# Patient Record
Sex: FEMALE | Race: WHITE | NOT HISPANIC OR LATINO
[De-identification: names, ages, dates, MRNs, and addresses within clinical notes are randomized per-mention and may not be internally consistent; named-entity substitution may affect disease eponyms.]

---

## 2017-10-05 ENCOUNTER — TRANSCRIPTION ENCOUNTER (OUTPATIENT)
Age: 62
End: 2017-10-05

## 2018-03-03 ENCOUNTER — TRANSCRIPTION ENCOUNTER (OUTPATIENT)
Age: 63
End: 2018-03-03

## 2018-08-22 ENCOUNTER — TRANSCRIPTION ENCOUNTER (OUTPATIENT)
Age: 63
End: 2018-08-22

## 2019-03-14 ENCOUNTER — TRANSCRIPTION ENCOUNTER (OUTPATIENT)
Age: 64
End: 2019-03-14

## 2019-06-28 ENCOUNTER — RECORD ABSTRACTING (OUTPATIENT)
Age: 64
End: 2019-06-28

## 2019-06-28 DIAGNOSIS — Z86.39 PERSONAL HISTORY OF OTHER ENDOCRINE, NUTRITIONAL AND METABOLIC DISEASE: ICD-10-CM

## 2019-06-28 DIAGNOSIS — Z86.79 PERSONAL HISTORY OF OTHER DISEASES OF THE CIRCULATORY SYSTEM: ICD-10-CM

## 2019-06-28 DIAGNOSIS — Z87.898 PERSONAL HISTORY OF OTHER SPECIFIED CONDITIONS: ICD-10-CM

## 2019-06-28 DIAGNOSIS — Z86.69 PERSONAL HISTORY OF OTHER DISEASES OF THE NERVOUS SYSTEM AND SENSE ORGANS: ICD-10-CM

## 2020-01-13 ENCOUNTER — APPOINTMENT (OUTPATIENT)
Dept: COLORECTAL SURGERY | Facility: CLINIC | Age: 65
End: 2020-01-13
Payer: COMMERCIAL

## 2020-01-13 VITALS
OXYGEN SATURATION: 97 % | HEIGHT: 60 IN | DIASTOLIC BLOOD PRESSURE: 78 MMHG | RESPIRATION RATE: 14 BRPM | WEIGHT: 125 LBS | BODY MASS INDEX: 24.54 KG/M2 | SYSTOLIC BLOOD PRESSURE: 131 MMHG | HEART RATE: 86 BPM

## 2020-01-13 DIAGNOSIS — K64.5 PERIANAL VENOUS THROMBOSIS: ICD-10-CM

## 2020-01-13 PROCEDURE — 99204 OFFICE O/P NEW MOD 45 MIN: CPT | Mod: 25

## 2020-01-13 PROCEDURE — 46600 DIAGNOSTIC ANOSCOPY SPX: CPT

## 2020-01-13 NOTE — PHYSICAL EXAM
[Normal Breath Sounds] : Normal breath sounds [Normal Rate and Rhythm] : normal rate and rhythm [Normal Heart Sounds] : normal heart sounds [No Rash or Lesion] : No rash or lesion [Alert] : alert [Oriented to Person] : oriented to person [Oriented to Place] : oriented to place [Oriented to Time] : oriented to time [Calm] : calm [Abdomen Masses] : No abdominal masses [Tender] : nontender [Abdomen Tenderness] : ~T No ~M abdominal tenderness [Normal rectal exam] : exam was normal [None] : no anal fissures seen [Excoriation] : no perianal excoriation [Multiple Sinus Tracts] : no perianal sinus tracts [Fistula] : no fistulas [Ulcer ___ cm] : no ulcers [Wart] : no warts [Pilonidal Cyst] : no pilonidal cysts [Pilonidal Sinus] : no pilonidal sinus [Pilonidal Sinus Draining] : no pilonidal sinus drainage [Tender, Swollen] : nontender, non-swollen [Skin Tags] : residual hemorrhoidal skin tags were noted [Thrombosed] : that was thrombosed [Normal] : was normal [JVD] : no jugular venous distention  [Wheezing] : no wheezing was heard [de-identified] : round, NT/ND, +BS [de-identified] : normal female [de-identified] : anoscopy reveals thrombosed external hemorrhoid that has burst [de-identified] : Intact [de-identified] : NELSON/+ROM [de-identified] : NC/AT

## 2020-01-13 NOTE — REVIEW OF SYSTEMS
[Wheezing] : wheezing [Shortness Of Breath] : shortness of breath [Negative] : Heme/Lymph [FreeTextEntry5] : Varicose Veins [FreeTextEntry7] : Daily BM

## 2020-01-13 NOTE — ASSESSMENT
[FreeTextEntry1] : 64-year-old female with right posterior spontaneously ruptured thrombosed external hemorrhoid. She is a family history significant for colon cancer and has never had a colonoscopy. I strongly reinforced the preventative benefits of colonoscopy.

## 2020-01-13 NOTE — HISTORY OF PRESENT ILLNESS
[FreeTextEntry1] : Patient is a 65 yo WF here with complaints of a swelling/growth on her anus.  She states that it did pop and it did bleed for a couple of days and then flattened out and stopped.  It feels much better now.  She does have a daily bowel movement 2-3 times a day and rarely strains and denies any bleeding normally.  She has never had a colonoscopy.

## 2021-09-12 ENCOUNTER — TRANSCRIPTION ENCOUNTER (OUTPATIENT)
Age: 66
End: 2021-09-12

## 2022-02-23 ENCOUNTER — APPOINTMENT (OUTPATIENT)
Dept: ENDOCRINOLOGY | Facility: CLINIC | Age: 67
End: 2022-02-23

## 2022-03-11 ENCOUNTER — APPOINTMENT (OUTPATIENT)
Dept: CARDIOLOGY | Facility: CLINIC | Age: 67
End: 2022-03-11
Payer: MEDICARE

## 2022-03-11 ENCOUNTER — NON-APPOINTMENT (OUTPATIENT)
Age: 67
End: 2022-03-11

## 2022-03-11 VITALS
BODY MASS INDEX: 24.15 KG/M2 | DIASTOLIC BLOOD PRESSURE: 78 MMHG | OXYGEN SATURATION: 98 % | SYSTOLIC BLOOD PRESSURE: 134 MMHG | HEIGHT: 60 IN | HEART RATE: 80 BPM | WEIGHT: 123 LBS | TEMPERATURE: 98 F | RESPIRATION RATE: 15 BRPM

## 2022-03-11 DIAGNOSIS — R40.0 SOMNOLENCE: ICD-10-CM

## 2022-03-11 PROCEDURE — 93306 TTE W/DOPPLER COMPLETE: CPT

## 2022-03-11 PROCEDURE — 93000 ELECTROCARDIOGRAM COMPLETE: CPT

## 2022-03-11 PROCEDURE — 99203 OFFICE O/P NEW LOW 30 MIN: CPT

## 2022-03-23 NOTE — DISCUSSION/SUMMARY
[FreeTextEntry1] : This is a 66-year-old female with past medical history significant for heart murmur,/mitral valve prolapse, since childhood, occasional dyspnea on exertion when walking up stairs, hypercholesterolemia, snoring, daytime fatigue, and witnessed apneic episode dyspepsia, occasional palpitations, who comes in for cardiac consultation.\par She denies chest pain, shortness of breath, dizziness or syncope.  She has no history of rheumatic fever.  She does not drink excessive caffeine or alcohol.\par Electrocardiogram March 11, 2022 demonstrate normal sinus rhythm rate of 80 bpm and is otherwise remarkable for poor R wave progression and left atrial abnormality.\par Blood work done by her endocrinologist February 3, 2022 demonstrated cholesterol 271, triglycerides 159, HDL of 57, LDL calculated 182 mg/dL and a blood sugar of 92 mg/dL.\par The patient is on no medications.  She does not wish to consider medical therapy for her hypercholesterolemia.  She understands the risk of myocardial infarction and stroke but prefers to focus on her diet and increase her daily walking.  I have explained to her that the success rate to achieve an LDL cholesterol of less than 100 mg/dL will be small.  She will have repeat blood work in 3 months.\par She will have an echo Doppler examination later today to evaluate her murmur, left ventricular function, chamber size, rule out mitral valve prolapse, rule out hypertrophy and to evaluate her ejection fraction.\par The patient is instructed follow-up with her primary care physician and endocrinologist.\par I recommended she schedule exercise stress test to evaluate her dyspnea on exertion.  She does not wish to do that at this time.  She prefers to increase her aerobic capacity on her own timetable.\par She should also schedule a sleep study to rule out sleep apnea.  She will take this under consideration as well.\par The patient understands that aerobic exercises must be increased to 40 minutes 4 times per week. A detailed discussion of lifestyle modification was done today. The patient has a good understanding of the diagnosis, and treatment plan. Lifestyle modification was also outlined.\par \par Thank you for allowing participate care of this patient.  Please do not hesitate to call if you have any questions.\par \par

## 2022-03-23 NOTE — PHYSICAL EXAM
[Well Developed] : well developed [Well Nourished] : well nourished [No Acute Distress] : no acute distress [Normal Conjunctiva] : normal conjunctiva [Normal Venous Pressure] : normal venous pressure [No Carotid Bruit] : no carotid bruit [Normal S1, S2] : normal S1, S2 [No Rub] : no rub [No Gallop] : no gallop [5th Left ICS - MCL] : palpated at the 5th LICS in the midclavicular line [Normal] : normal [No Precordial Heave] : no precordial heave was noted [Normal Rate] : normal [Rhythm Regular] : regular [Normal S1] : normal S1 [Normal S2] : normal S2 [II] : a grade 2 [No Pitting Edema] : no pitting edema present [2+] : left 2+ [No Abnormalities] : the abdominal aorta was not enlarged and no bruit was heard [Clear Lung Fields] : clear lung fields [Good Air Entry] : good air entry [No Respiratory Distress] : no respiratory distress  [Soft] : abdomen soft [Non Tender] : non-tender [No Masses/organomegaly] : no masses/organomegaly [Normal Bowel Sounds] : normal bowel sounds [Normal Gait] : normal gait [No Edema] : no edema [No Cyanosis] : no cyanosis [No Clubbing] : no clubbing [No Varicosities] : no varicosities [No Rash] : no rash [No Skin Lesions] : no skin lesions [Moves all extremities] : moves all extremities [No Focal Deficits] : no focal deficits [Normal Speech] : normal speech [Alert and Oriented] : alert and oriented [Normal memory] : normal memory [S3] : no S3 [S4] : no S4 [Right Carotid Bruit] : no bruit heard over the right carotid [Left Carotid Bruit] : no bruit heard over the left carotid [Right Femoral Bruit] : no bruit heard over the right femoral artery [Left Femoral Bruit] : no bruit heard over the left femoral artery

## 2022-03-23 NOTE — REASON FOR VISIT
[Symptom and Test Evaluation] : symptom and test evaluation [CV Risk Factors and Non-Cardiac Disease] : CV risk factors and non-cardiac disease [Structural Heart and Valve Disease] : structural heart and valve disease [Hyperlipidemia] : hyperlipidemia [FreeTextEntry1] : 66 year old female with past medical history of mitral valve prolapse since she was a child, hyperlipidemia, hearing loss, tinnitus, presents for cardiac/lipid evaluation. Patient states she has been feeling some dyspnea on exertion whenever she goes up stairs or uphill. Complains of palpitations for about a month now where it feels as if her heart skips a beat. Patient states she feels tired during the day, snores at times throughout the night, and may have had a apneic episode at night. \par Labs drawn on 2/03/2022 demonstrate cholesterol of 271, triglycerides of 158, HDL 57, LDL calculated 182.

## 2022-06-14 ENCOUNTER — LABORATORY RESULT (OUTPATIENT)
Age: 67
End: 2022-06-14

## 2022-06-14 ENCOUNTER — APPOINTMENT (OUTPATIENT)
Dept: CARDIOLOGY | Facility: CLINIC | Age: 67
End: 2022-06-14
Payer: MEDICARE

## 2022-06-14 VITALS
WEIGHT: 126 LBS | TEMPERATURE: 98.3 F | HEIGHT: 60 IN | DIASTOLIC BLOOD PRESSURE: 78 MMHG | BODY MASS INDEX: 24.74 KG/M2 | HEART RATE: 80 BPM | SYSTOLIC BLOOD PRESSURE: 122 MMHG | RESPIRATION RATE: 16 BRPM | OXYGEN SATURATION: 99 %

## 2022-06-14 DIAGNOSIS — R00.2 PALPITATIONS: ICD-10-CM

## 2022-06-14 DIAGNOSIS — R01.1 CARDIAC MURMUR, UNSPECIFIED: ICD-10-CM

## 2022-06-14 DIAGNOSIS — R06.81 APNEA, NOT ELSEWHERE CLASSIFIED: ICD-10-CM

## 2022-06-14 DIAGNOSIS — R06.00 DYSPNEA, UNSPECIFIED: ICD-10-CM

## 2022-06-14 DIAGNOSIS — I34.1 NONRHEUMATIC MITRAL (VALVE) PROLAPSE: ICD-10-CM

## 2022-06-14 DIAGNOSIS — I65.29 OCCLUSION AND STENOSIS OF UNSPECIFIED CAROTID ARTERY: ICD-10-CM

## 2022-06-14 DIAGNOSIS — E78.00 PURE HYPERCHOLESTEROLEMIA, UNSPECIFIED: ICD-10-CM

## 2022-06-14 DIAGNOSIS — G47.00 INSOMNIA, UNSPECIFIED: ICD-10-CM

## 2022-06-14 DIAGNOSIS — R06.83 SNORING: ICD-10-CM

## 2022-06-14 PROCEDURE — 93880 EXTRACRANIAL BILAT STUDY: CPT

## 2022-06-14 PROCEDURE — 99214 OFFICE O/P EST MOD 30 MIN: CPT

## 2022-06-14 NOTE — DISCUSSION/SUMMARY
[FreeTextEntry1] : This is a 67-year-old female with past medical history significant for heart murmur,/mitral valve prolapse, since childhood, occasional dyspnea on exertion when walking up stairs, hypercholesterolemia, snoring, daytime fatigue, and witnessed apneic episode dyspepsia, occasional palpitations, who comes in for cardiac consultation.\par She denies chest pain, shortness of breath, dizziness or syncope.  She has no history of rheumatic fever.  She does not drink excessive caffeine or alcohol.\par The patient was scheduled for an exercise stress test today, but she felt a little dizzy with the heat outside and wishes to postpone this evaluation to a later date.\par She was also scheduled for sleep study to rule out sleep apnea but will postpone this to a later date.\par Echo Doppler examination done March 11, 2022 demonstrated minimal mitral valve regurgitation, minimal pulmonic valve regurgitation, mild tricuspid valve regurgitation, dyskinesis of the interatrial septum, and normal ejection fraction of 66%.\par \par Electrocardiogram March 11, 2022 demonstrate normal sinus rhythm rate of 80 bpm and is otherwise remarkable for poor R wave progression and left atrial abnormality.\par \par Blood work done by her endocrinologist February 3, 2022 demonstrated cholesterol 271, triglycerides 159, HDL of 57, LDL calculated 182 mg/dL and a blood sugar of 92 mg/dL.\par \par The patient is on no medications.  She does not wish to consider medical therapy for her hypercholesterolemia.  She understands the risk of myocardial infarction and stroke but prefers to focus on her diet and increase her daily walking.  I have explained to her that the success rate to achieve an LDL cholesterol of less than 100 mg/dL will be small. \par I recommended she schedule exercise stress test to evaluate her dyspnea on exertion.  She does not wish to do that at this time.  She prefers to increase her aerobic capacity on her own timetable.\par  \par The patient understands that aerobic exercises must be increased to 40 minutes 4 times per week. A detailed discussion of lifestyle modification was done today. The patient has a good understanding of the diagnosis, and treatment plan. Lifestyle modification was also outlined.\par \par Thank you for allowing participate care of this patient.  Please do not hesitate to call if you have any questions.\par \par

## 2022-06-14 NOTE — REASON FOR VISIT
[Symptom and Test Evaluation] : symptom and test evaluation [CV Risk Factors and Non-Cardiac Disease] : CV risk factors and non-cardiac disease [Structural Heart and Valve Disease] : structural heart and valve disease [Hyperlipidemia] : hyperlipidemia [FreeTextEntry1] : This is a 67-year-old female with past medical history significant for heart murmur/mitral valve prolapse since childhood, occasional dyspnea on exertion when walking up stairs, hypercholesterolemia, snoring, daytime fatigue, and witnessed apneic episode, dyspepsia, occasional palpitations, who comes in for cardiac follow-up evaluation. Today, she reports feeling dizzy and would like to reschedule her stress test for a later date. She denies chest pain, shortness of breath, or syncope.  She has no history of rheumatic fever.  She does not drink excessive caffeine or alcohol.\par \par Blood work done by her endocrinologist February 3, 2022 demonstrated cholesterol 271, triglycerides 159, HDL of 57, LDL calculated 182 mg/dL and a blood sugar of 92 mg/dL.\par \par The patient is on no medications. She does not wish to consider medical therapy for her hypercholesterolemia. She understands the risk of myocardial infarction and stroke but prefers to focus on her diet and increase her daily walking. I have explained to her that the success rate to achieve an LDL cholesterol of less than 100 mg/dL will be small.  \par \par She will undergo home sleep study to assess for possible sleep apnea. Blood work has been drawn to assess lipid profile and cbc. She will reschedule stress test and follow up with me in 3 months. \par \par

## 2022-06-15 RX ORDER — HYDROCORTISONE 25 MG/G
2.5 CREAM TOPICAL
Qty: 1 | Refills: 6 | Status: COMPLETED | COMMUNITY
Start: 2020-01-13 | End: 2022-06-15

## 2022-06-21 ENCOUNTER — NON-APPOINTMENT (OUTPATIENT)
Age: 67
End: 2022-06-21

## 2022-06-24 ENCOUNTER — NON-APPOINTMENT (OUTPATIENT)
Age: 67
End: 2022-06-24

## 2022-06-24 DIAGNOSIS — E78.01 FAMILIAL HYPERCHOLESTEROLEMIA: ICD-10-CM

## 2022-07-10 PROBLEM — I65.29 CAROTID ARTERY PLAQUE: Status: ACTIVE | Noted: 2022-03-11

## 2022-10-04 ENCOUNTER — APPOINTMENT (OUTPATIENT)
Dept: CARDIOLOGY | Facility: CLINIC | Age: 67
End: 2022-10-04

## 2022-11-16 ENCOUNTER — APPOINTMENT (OUTPATIENT)
Dept: CARDIOLOGY | Facility: CLINIC | Age: 67
End: 2022-11-16

## 2023-02-15 ENCOUNTER — APPOINTMENT (OUTPATIENT)
Dept: OPHTHALMOLOGY | Facility: CLINIC | Age: 68
End: 2023-02-15
Payer: MEDICARE

## 2023-02-15 ENCOUNTER — NON-APPOINTMENT (OUTPATIENT)
Age: 68
End: 2023-02-15

## 2023-02-15 PROCEDURE — 92004 COMPRE OPH EXAM NEW PT 1/>: CPT

## 2023-03-27 ENCOUNTER — NON-APPOINTMENT (OUTPATIENT)
Age: 68
End: 2023-03-27

## 2023-03-27 ENCOUNTER — APPOINTMENT (OUTPATIENT)
Dept: OPHTHALMOLOGY | Facility: CLINIC | Age: 68
End: 2023-03-27
Payer: MEDICARE

## 2023-03-27 PROCEDURE — 92014 COMPRE OPH EXAM EST PT 1/>: CPT

## 2023-04-25 ENCOUNTER — APPOINTMENT (OUTPATIENT)
Dept: ENDOCRINOLOGY | Facility: CLINIC | Age: 68
End: 2023-04-25
Payer: MEDICARE

## 2023-04-25 VITALS
DIASTOLIC BLOOD PRESSURE: 80 MMHG | TEMPERATURE: 98 F | WEIGHT: 121 LBS | OXYGEN SATURATION: 97 % | HEART RATE: 75 BPM | BODY MASS INDEX: 23.75 KG/M2 | SYSTOLIC BLOOD PRESSURE: 130 MMHG | HEIGHT: 60 IN

## 2023-04-25 DIAGNOSIS — E03.9 HYPOTHYROIDISM, UNSPECIFIED: ICD-10-CM

## 2023-04-25 DIAGNOSIS — E16.1 OTHER HYPOGLYCEMIA: ICD-10-CM

## 2023-04-25 DIAGNOSIS — R53.83 OTHER FATIGUE: ICD-10-CM

## 2023-04-25 DIAGNOSIS — E78.5 HYPERLIPIDEMIA, UNSPECIFIED: ICD-10-CM

## 2023-04-25 PROCEDURE — 99204 OFFICE O/P NEW MOD 45 MIN: CPT

## 2023-05-02 PROBLEM — E78.5 HYPERLIPIDEMIA: Status: ACTIVE | Noted: 2022-03-11

## 2023-05-02 NOTE — HISTORY OF PRESENT ILLNESS
[FreeTextEntry1] : Ms. LUCERO  is a 68 year old  female who presents for initial endocrine evaluation. She presents with regard to a history of hypothyroidism and fatigue along with symptoms of feeling "off and shaky".  These symptoms started occurring over the past two years. She  did seek evaluation with another doctor- unsure of the name,\par \par She states she was told of apparent mild hypothyroidism but was never \par \par She has noted symptoms of shakiness in the middle of the night and/or  when she wakes up. These symptoms would often times would be alleviated when she ate or drank  something like juice or cheese. these symptoms have not occurred during the day. She does have a glucose   meter but has not tested her glucose levels at times of these episodes. \par Has some intermittent palpations but these are infrequent.Overall, these symptoms occur about once every 10 days or so but her last episode occurred  about a month ago or so\par She denies any difficulty sleeping. \par \par Reports improving her carb intake as of late\par \par Family history: \par Father cardiac history \par \par  Additional medical history includes that of HLD, insomnia, MVP\par \par Medications: multivitamin, Vit C Vit D3 2000Iu once a day, Calcium twice daily. \par She does have leafy greens and cheese in her diet. \par \par Denies surgeries \par \par denies hospitalizations \par \par Social history: \par no smoking \par no alcohol use \par denies recent infection or covid infection.

## 2023-05-17 LAB
25(OH)D3 SERPL-MCNC: 45.1 NG/ML
ALBUMIN SERPL ELPH-MCNC: 4.3 G/DL
ALP BLD-CCNC: 103 U/L
ALT SERPL-CCNC: 21 U/L
ANION GAP SERPL CALC-SCNC: 11 MMOL/L
AST SERPL-CCNC: 26 U/L
BASOPHILS # BLD AUTO: 0.05 K/UL
BASOPHILS NFR BLD AUTO: 0.9 %
BILIRUB SERPL-MCNC: 0.6 MG/DL
BUN SERPL-MCNC: 16 MG/DL
CALCIUM SERPL-MCNC: 9.2 MG/DL
CHLORIDE SERPL-SCNC: 105 MMOL/L
CO2 SERPL-SCNC: 25 MMOL/L
CORTIS SERPL-MCNC: 19.7 UG/DL
CREAT SERPL-MCNC: 0.73 MG/DL
EGFR: 90 ML/MIN/1.73M2
EOSINOPHIL # BLD AUTO: 0.09 K/UL
EOSINOPHIL NFR BLD AUTO: 1.6 %
ESTIMATED AVERAGE GLUCOSE: 114 MG/DL
FERRITIN SERPL-MCNC: 83 NG/ML
FOLATE SERPL-MCNC: >20 NG/ML
GLUCOSE SERPL-MCNC: 105 MG/DL
HBA1C MFR BLD HPLC: 5.6 %
HCT VFR BLD CALC: 38.6 %
HGB BLD-MCNC: 12.8 G/DL
IMM GRANULOCYTES NFR BLD AUTO: 0.2 %
IRON SERPL-MCNC: 77 UG/DL
LYMPHOCYTES # BLD AUTO: 2.12 K/UL
LYMPHOCYTES NFR BLD AUTO: 38.2 %
MAGNESIUM SERPL-MCNC: 2.1 MG/DL
MAN DIFF?: NORMAL
MCHC RBC-ENTMCNC: 30.9 PG
MCHC RBC-ENTMCNC: 33.2 GM/DL
MCV RBC AUTO: 93.2 FL
MONOCYTES # BLD AUTO: 0.5 K/UL
MONOCYTES NFR BLD AUTO: 9 %
NEUTROPHILS # BLD AUTO: 2.78 K/UL
NEUTROPHILS NFR BLD AUTO: 50.1 %
PLATELET # BLD AUTO: 252 K/UL
POTASSIUM SERPL-SCNC: 3.7 MMOL/L
PROT SERPL-MCNC: 6.6 G/DL
RBC # BLD: 4.14 M/UL
RBC # FLD: 13 %
SODIUM SERPL-SCNC: 141 MMOL/L
T3FREE SERPL-MCNC: 2.66 PG/ML
T4 FREE SERPL-MCNC: 1.1 NG/DL
THYROGLOB AB SERPL-ACNC: <20 IU/ML
THYROPEROXIDASE AB SERPL IA-ACNC: 37.1 IU/ML
TSH SERPL-ACNC: 4.59 UIU/ML
VIT B12 SERPL-MCNC: 1501 PG/ML
WBC # FLD AUTO: 5.55 K/UL

## 2023-05-18 LAB — TSI ACT/NOR SER: <0.1 IU/L

## 2023-05-19 LAB — TSH RECEPTOR AB: <1.1 IU/L

## 2023-05-22 LAB
UBIQUINONE10 SERPL-MCNC: 0.96 UG/ML
VIT B6 SERPL-MCNC: 109.8 UG/L

## 2023-05-23 LAB — VIT B2 SERPL-MCNC: 231 UG/L

## 2023-05-25 LAB
NICOTINAMIDE: 20.3 NG/ML
NICOTINIC ACID: <5 NG/ML

## 2024-02-22 ENCOUNTER — NON-APPOINTMENT (OUTPATIENT)
Age: 69
End: 2024-02-22

## 2024-02-23 ENCOUNTER — EMERGENCY (EMERGENCY)
Facility: HOSPITAL | Age: 69
LOS: 1 days | Discharge: HOME | End: 2024-02-23
Attending: EMERGENCY MEDICINE
Payer: MEDICARE

## 2024-02-23 VITALS
DIASTOLIC BLOOD PRESSURE: 73 MMHG | RESPIRATION RATE: 18 BRPM | WEIGHT: 154.1 LBS | OXYGEN SATURATION: 98 % | SYSTOLIC BLOOD PRESSURE: 126 MMHG | TEMPERATURE: 98 F | HEART RATE: 80 BPM | HEIGHT: 61 IN

## 2024-02-23 PROCEDURE — 99283 EMERGENCY DEPT VISIT LOW MDM: CPT

## 2024-02-24 LAB
APPEARANCE UR: CLEAR — SIGNIFICANT CHANGE UP
BACTERIA # UR AUTO: NEGATIVE /HPF — SIGNIFICANT CHANGE UP
BILIRUB UR-MCNC: NEGATIVE — SIGNIFICANT CHANGE UP
CAST: 0 /LPF — SIGNIFICANT CHANGE UP (ref 0–4)
COLOR SPEC: YELLOW — SIGNIFICANT CHANGE UP
DIFF PNL FLD: NEGATIVE — SIGNIFICANT CHANGE UP
GLUCOSE UR QL: NEGATIVE MG/DL — SIGNIFICANT CHANGE UP
KETONES UR-MCNC: ABNORMAL MG/DL
LEUKOCYTE ESTERASE UR-ACNC: NEGATIVE — SIGNIFICANT CHANGE UP
NITRITE UR-MCNC: NEGATIVE — SIGNIFICANT CHANGE UP
PH UR: 7.5 — SIGNIFICANT CHANGE UP (ref 5–8)
PROT UR-MCNC: NEGATIVE MG/DL — SIGNIFICANT CHANGE UP
RBC CASTS # UR COMP ASSIST: 1 /HPF — SIGNIFICANT CHANGE UP (ref 0–4)
SP GR SPEC: 1.01 — SIGNIFICANT CHANGE UP (ref 1–1.03)
SQUAMOUS # UR AUTO: 0 /HPF — SIGNIFICANT CHANGE UP (ref 0–5)
UROBILINOGEN FLD QL: 0.2 MG/DL — SIGNIFICANT CHANGE UP (ref 0.2–1)
WBC UR QL: 1 /HPF — SIGNIFICANT CHANGE UP (ref 0–5)

## 2024-02-24 PROCEDURE — 99283 EMERGENCY DEPT VISIT LOW MDM: CPT

## 2024-02-24 PROCEDURE — 81001 URINALYSIS AUTO W/SCOPE: CPT

## 2024-02-24 PROCEDURE — 87086 URINE CULTURE/COLONY COUNT: CPT

## 2024-02-24 RX ORDER — POLYETHYLENE GLYCOL 3350 17 G/17G
17 POWDER, FOR SOLUTION ORAL ONCE
Refills: 0 | Status: COMPLETED | OUTPATIENT
Start: 2024-02-24 | End: 2024-02-24

## 2024-02-24 RX ADMIN — POLYETHYLENE GLYCOL 3350 17 GRAM(S): 17 POWDER, FOR SOLUTION ORAL at 03:19

## 2024-02-24 NOTE — ED PROVIDER NOTE - ATTENDING CONTRIBUTION TO CARE
MD Mckeon:  patient seen and evaluated personally.   I agree with the History & Physical,  Impression & Plan other than what was detailed in my note.  MD Mckeon  69 y/o f presenting w/ 1 week of constipation as well as rectal bleeding on toilet paper, brb small amount. Pt states he has had pressure in abd, has had small pellets in bm, no n/v no abd pain only pressure, took laxatives, no f/c no ha, bv, no palpitations, no f/c afebrile vitals stable  non toxic well appearing, NC/AT,  conjunctiva non conjected, sclera anicteric, moist mucous membranes, neck supple, heart sounds, normal, no mrg, lungs cta b/l no wrr, abd soft non distended w/ no tenderness, no visual deformities of extremities, axox3, , normal mood and affect, pt w/ fissure on exam, would explain bleeding, plan for disimpaction, ua, re evaluate symptoms, given no abd pain, no ttp on exam other than equivocal sp ttp do not feel labs imaging are inicated at this time.

## 2024-02-24 NOTE — ED PROVIDER NOTE - CLINICAL SUMMARY MEDICAL DECISION MAKING FREE TEXT BOX
69 y/o female hx 67 y/o female no pmh presents with one week of constipation. She is hemodynamically stable, afebrile, on exam she has abdominal distension without tenderness. Presentation consistent with constipation. No prior abdominal surgery, medical problems, or pain concerning for SBO. Will offer disimpaction, enema.

## 2024-02-24 NOTE — ED ADULT NURSE NOTE - OBJECTIVE STATEMENT
67 y/o F with no significant PMHx presents to the ED with complaints of constipation x 1 week. Patient with reports of increasing abdominal pain/pressure associated with difficulty passing stool. Patient described small, hard stools with scant amount of blood after straining. Patient was seen at urgent care and given laxatives without improvement of sxs, advised to present to the ED for further evaluation. Patient denies fever, chills, nausea, vomiting, diarrhea, SOB. AOx4 and speaking coherently. Breathing is unlabored, spontaneous, and symmetrical. Abdomen is soft, nondistended, and nontender. No peripheral edema noted. <2s capillary refill.     67 y/o female no pmh presents with one week of constipation. She has felt increasing abdominal pressure associated with small, hard stools and small amounts of blood in stool after straining. She denies abdominal pain, fever/chills, n/v/d, chest pain, shortness of breath. She was seen at urgent care today and given two laxatives but she has not been able to go to the bathroom. She was sent to the ER by urgent care for disimpaction.

## 2024-02-24 NOTE — ED PROVIDER NOTE - PATIENT PORTAL LINK FT
You can access the FollowMyHealth Patient Portal offered by [facility] by registering at the following website: [email or web address]. By joining [facility]’s FollowMyHealth portal, you will also be able to view your health information using other applications (apps) compatible with our system.

## 2024-02-24 NOTE — ED PROVIDER NOTE - NSFOLLOWUPINSTRUCTIONS_ED_ALL_ED_FT
Home Care Instructions:    Dietary Changes:    Increase Fiber Intake: Aim for 25-30 grams of fiber per day from sources such as fruits, vegetables, whole grains, and legumes.  Stay Hydrated: Drink at least 8-10 glasses of water daily. Fluids can help soften stools, making them easier to pass.  Limit Low-Fiber Foods: Reduce the intake of foods high in fat and sugar, which can exacerbate constipation.  Lifestyle Modifications:    Regular Exercise: Engage in at least 30 minutes of moderate exercise most days of the week. Physical activity can help stimulate bowel movements.  Establish a Routine: Try to use the bathroom at the same times each day, especially after meals, to help establish a regular bowel habit.  Over-the-Counter (OTC) Remedies:    Fiber Supplements: If dietary adjustments are insufficient, consider fiber supplements after consulting with your healthcare provider.  Stool Softeners or Laxatives: Use as directed by your healthcare provider. Avoid long-term use without medical supervision.  When to Seek Medical Attention:    If you experience severe abdominal pain, bloating, or vomiting.  If there is blood in your stools or if they are black and tar-like.  If constipation persists for more than two weeks despite home treatment.  If you experience unexplained weight loss.  If you have difficulty or pain during bowel movements.  Medication Management:    Review any current medications with your healthcare provider, as some may contribute to constipation.  Do not stop any prescribed medication without first consulting with your healthcare provider.  Follow-Up Care:    Schedule a follow-up appointment with your healthcare provider as recommended, especially if symptoms persist or worsen.  Additional Tips:    Avoid the excessive use of enemas or laxatives without consulting your healthcare provider, as overuse can lead to dependency or worsen constipation.  Consider keeping a diary of your bowel habits, diet, and fluid intake, which can be helpful for your healthcare provider in managing your condition.  Emergency Contact:    Should you experience severe symptoms or complications, seek immediate medical attention or contact your healthcare provider.

## 2024-02-24 NOTE — ED PROVIDER NOTE - OBJECTIVE STATEMENT
69 y/o female hx 67 y/o female no pmh presents with one week of constipation. She has felt increasing abdominal pressure associated with small, hard stools and small amounts of blood in stool after straining. She denies abdominal pain, fever/chills, n/v/d, chest pain, shortness of breath. She was seen at urgent care today and given two laxatives but she has not been able to go to the bathroom. She was sent to the ER by urgent care for disimpaction.

## 2024-02-24 NOTE — ED PROVIDER NOTE - PHYSICAL EXAMINATION
General: Alert and Orientated x 3. No apparent distress.  Head: Normocephalic and atraumatic.  Eyes: PERRLA with EOMI.   ENT: MMM, Oropharynx clear  Neck: Supple. Trachea midline.   Cardiac: Normal S1 and S2 w/ RRR. No murmurs appreciated.   Pulmonary: CTA bilaterally. No increased WOB.   Abdominal: Soft, non-tender, non-distended  Neurologic: No focal sensory or motor deficits.  Musculoskeletal: Strength appropriate in all 4 extremities for age with no limited ROM.  Skin: Color appropriate for race. Intact, warm, and well-perfused.  Psychiatric: Appropriate mood and affect. No apparent risk to self or others. General: Alert and Orientated x 3. No apparent distress.  Head: Normocephalic and atraumatic.  Eyes: PERRLA with EOMI.   ENT: MMM  Neck: Supple  Cardiac: Normal S1 and S2 w/ RRR. No murmurs appreciated.   Pulmonary: CTA bilaterally. No increased WOB.   Abdominal: Soft, distended, non-tender to palpation  Neurologic: No focal sensory or motor deficits.  Musculoskeletal: Strength appropriate in all 4 extremities for age with no limited ROM.  Skin: Color appropriate for race. Intact, warm, and well-perfused.  Psychiatric: Appropriate mood and affect. No apparent risk to self or others.

## 2024-02-25 LAB
CULTURE RESULTS: SIGNIFICANT CHANGE UP
SPECIMEN SOURCE: SIGNIFICANT CHANGE UP

## 2024-02-26 ENCOUNTER — APPOINTMENT (OUTPATIENT)
Dept: COLORECTAL SURGERY | Facility: CLINIC | Age: 69
End: 2024-02-26
Payer: MEDICARE

## 2024-02-26 VITALS
HEART RATE: 86 BPM | DIASTOLIC BLOOD PRESSURE: 85 MMHG | SYSTOLIC BLOOD PRESSURE: 125 MMHG | RESPIRATION RATE: 16 BRPM | OXYGEN SATURATION: 96 % | HEIGHT: 60 IN | TEMPERATURE: 99.5 F | WEIGHT: 120 LBS | BODY MASS INDEX: 23.56 KG/M2

## 2024-02-26 DIAGNOSIS — Z87.19 PERSONAL HISTORY OF OTHER DISEASES OF THE DIGESTIVE SYSTEM: ICD-10-CM

## 2024-02-26 DIAGNOSIS — Z80.0 FAMILY HISTORY OF MALIGNANT NEOPLASM OF DIGESTIVE ORGANS: ICD-10-CM

## 2024-02-26 PROCEDURE — 99203 OFFICE O/P NEW LOW 30 MIN: CPT

## 2024-02-26 RX ORDER — ELECTROLYTES/DEXTROSE
SOLUTION, ORAL ORAL
Refills: 0 | Status: ACTIVE | COMMUNITY

## 2024-02-26 NOTE — PHYSICAL EXAM
[Normal Breath Sounds] : Normal breath sounds [Normal Heart Sounds] : normal heart sounds [Normal Rate and Rhythm] : normal rate and rhythm [Alert] : alert [No Rash or Lesion] : No rash or lesion [Oriented to Person] : oriented to person [Oriented to Place] : oriented to place [Oriented to Time] : oriented to time [Abdomen Masses] : No abdominal masses [Abdomen Tenderness] : ~T No ~M abdominal tenderness [Anterior] : anteriorly [Posterior] : posteriorly [Wheezing] : no wheezing was heard [Purpura] : no purpura  [Petechiae] : no petechiae [Skin Ulcer] : no ulcer [de-identified] : benign [Skin Induration] : no induration [de-identified] : healthy, well nourished, well [de-identified] : NC/AT [de-identified] : LOPEZ< steady gait

## 2024-02-26 NOTE — HISTORY OF PRESENT ILLNESS
[FreeTextEntry1] : 69 y/o female with history of constipation and rectal bleeding presents today for initial consultation.  She has history of hemorrhoids for which she was seen previously, but reports that they are not bothering her now.  She complained that constipation had worsened recently and she was unable to have a bowel movement for many days, and notes increased straining/pushing.  On Friday 2/23/24 went to Urgent Care and was prescribed Linzess and Miralax but it did not work and she went the ED because she was completely impacted.  She was manually disimpacted in the ED.     She now reports daily bowel movements and notes some blood clots in the toilet and blood on toilet tissue.  She is still passing pellets.  She normally takes fiber supplement.    She never had a colonoscopy, but is awaiting a consultation with a gastroenterologist Dr. Reddy.

## 2024-02-26 NOTE — REASON FOR VISIT
[Consultation] : a consultation visit [Spouse] : spouse [FreeTextEntry1] : Pt. intake forms reviewed.

## 2024-02-26 NOTE — ASSESSMENT
[FreeTextEntry1] : 68 F with chronic constipation / recent dispimapction.  increase fluid / fiber in diet  miralax prn F/u with GI for colonoscopy

## 2024-02-26 NOTE — REVIEW OF SYSTEMS
[Loss Of Hearing] : hearing loss [Constipation] : constipation [Negative] : Heme/Lymph [Abdominal Pain] : no abdominal pain [Diarrhea] : no diarrhea [Vomiting] : no vomiting [FreeTextEntry4] : dry mouth

## 2024-04-04 ENCOUNTER — APPOINTMENT (OUTPATIENT)
Dept: PHARMACY | Facility: CLINIC | Age: 69
End: 2024-04-04
Payer: SELF-PAY

## 2024-04-04 PROCEDURE — V5010 ASSESSMENT FOR HEARING AID: CPT | Mod: NC

## 2024-04-04 NOTE — HISTORY OF PRESENT ILLNESS
[FreeTextEntry1] : Patient is a 69 year old female who has been followed by ENT Dr. Murphy for hearing loss and cerumen management. 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 Patient reports that she has not worn hearing aids before. Most recent hearing test results show moderately-severe SNHL rising to normal hearing, AU. Patient has medical clearance for hearing aids scanned into her chart from Dr. Jerez.

## 2024-04-25 ENCOUNTER — APPOINTMENT (OUTPATIENT)
Dept: PHARMACY | Facility: CLINIC | Age: 69
End: 2024-04-25

## 2024-05-08 ENCOUNTER — APPOINTMENT (OUTPATIENT)
Dept: PHARMACY | Facility: CLINIC | Age: 69
End: 2024-05-08
Payer: SELF-PAY

## 2024-05-08 PROCEDURE — V5010 ASSESSMENT FOR HEARING AID: CPT

## 2024-05-08 NOTE — PROCEDURE
[de-identified] : MCL: 70dB Ad and 65dB As UCL: 85dB Au  HHIA was performed and and pts Total score of 50% andher S score of 58% fell in the Significant handicap range.  Her E score of 42% fell in the moderate Handicap range.  In addition, speech in noise abilities were evaluated using the Qsin.  Pts S/N ratio of +13.5 to +8.5dB were consistent with difficulties understanding speech in background noise.    Counseled re: styles and technologies. Reviewed limitations of Nonwireless products, (Ear to Ear communication) and effects on abilities to hear in background noise.  NOted poor SRs and QSin scores and pt expressed understanding of lmiitations.  EMIs taken Au without incident.  ShoutOut [id number] Half shell HA ordered.  Purchase agreement signed.

## 2024-05-08 NOTE — HISTORY OF PRESENT ILLNESS
[FreeTextEntry1] : 69 year old female with upwardly sloping sensorineural hearing loss bilaterally along with tinnitus. Medical clearance for amplification was provided by DR. Murphy.  [FreeTextEntry8] : Pt was seen several weeks ago for a HA consultation.  At that time, Phonak Virto P-70 half shell HAs were discussed at pt request as she only wants a Non Wireless HA in a half shell style.  However, earmold impressions could not be performed due to impacted cerumen.  Cerumen was removed by ENT and pt returns to place HA order.

## 2024-06-10 ENCOUNTER — APPOINTMENT (OUTPATIENT)
Dept: GASTROENTEROLOGY | Facility: CLINIC | Age: 69
End: 2024-06-10

## 2024-06-12 ENCOUNTER — APPOINTMENT (OUTPATIENT)
Dept: PHARMACY | Facility: CLINIC | Age: 69
End: 2024-06-12

## 2024-07-09 ENCOUNTER — APPOINTMENT (OUTPATIENT)
Dept: PHARMACY | Facility: CLINIC | Age: 69
End: 2024-07-09

## 2024-07-09 PROCEDURE — V5260D: CUSTOM

## 2024-07-18 ENCOUNTER — APPOINTMENT (OUTPATIENT)
Dept: PHARMACY | Facility: CLINIC | Age: 69
End: 2024-07-18
Payer: SELF-PAY

## 2024-07-18 ENCOUNTER — APPOINTMENT (OUTPATIENT)
Dept: PHARMACY | Facility: CLINIC | Age: 69
End: 2024-07-18

## 2024-07-18 PROCEDURE — V5299A: CUSTOM

## 2024-08-15 ENCOUNTER — APPOINTMENT (OUTPATIENT)
Dept: PHARMACY | Facility: CLINIC | Age: 69
End: 2024-08-15
Payer: COMMERCIAL

## 2024-08-15 PROCEDURE — V5299A: CUSTOM

## 2024-09-03 ENCOUNTER — APPOINTMENT (OUTPATIENT)
Dept: PHARMACY | Facility: CLINIC | Age: 69
End: 2024-09-03
Payer: SELF-PAY

## 2024-09-03 PROCEDURE — V5299A: CUSTOM | Mod: NC

## 2024-09-30 ENCOUNTER — APPOINTMENT (OUTPATIENT)
Dept: PHARMACY | Facility: CLINIC | Age: 69
End: 2024-09-30

## 2024-09-30 PROCEDURE — V5299A: CUSTOM

## 2024-10-17 ENCOUNTER — APPOINTMENT (OUTPATIENT)
Dept: PHARMACY | Facility: CLINIC | Age: 69
End: 2024-10-17
Payer: SELF-PAY

## 2024-10-17 PROCEDURE — V5299A: CUSTOM

## 2024-11-14 ENCOUNTER — APPOINTMENT (OUTPATIENT)
Dept: PHARMACY | Facility: CLINIC | Age: 69
End: 2024-11-14
Payer: COMMERCIAL

## 2024-11-14 PROCEDURE — V5299A: CUSTOM

## 2024-12-11 ENCOUNTER — APPOINTMENT (OUTPATIENT)
Dept: SPEECH THERAPY | Facility: CLINIC | Age: 69
End: 2024-12-11

## 2024-12-27 ENCOUNTER — OUTPATIENT (OUTPATIENT)
Dept: OUTPATIENT SERVICES | Facility: HOSPITAL | Age: 69
LOS: 1 days | Discharge: HOME | End: 2024-12-27

## 2024-12-27 ENCOUNTER — APPOINTMENT (OUTPATIENT)
Dept: SPEECH THERAPY | Facility: CLINIC | Age: 69
End: 2024-12-27

## 2025-01-03 DIAGNOSIS — H90.3 SENSORINEURAL HEARING LOSS, BILATERAL: ICD-10-CM

## 2025-03-04 ENCOUNTER — APPOINTMENT (OUTPATIENT)
Dept: PHARMACY | Facility: CLINIC | Age: 70
End: 2025-03-04
Payer: SELF-PAY

## 2025-03-04 PROCEDURE — V5299A: CUSTOM

## 2025-07-29 ENCOUNTER — APPOINTMENT (OUTPATIENT)
Dept: PHARMACY | Facility: CLINIC | Age: 70
End: 2025-07-29

## 2025-08-28 ENCOUNTER — APPOINTMENT (OUTPATIENT)
Dept: PHARMACY | Facility: CLINIC | Age: 70
End: 2025-08-28
Payer: SELF-PAY

## 2025-08-28 PROCEDURE — V5299A: CUSTOM

## 2025-09-16 ENCOUNTER — APPOINTMENT (OUTPATIENT)
Dept: PHARMACY | Facility: CLINIC | Age: 70
End: 2025-09-16
Payer: SELF-PAY

## 2025-09-16 PROCEDURE — V5299A: CUSTOM
